# Patient Record
Sex: MALE | Race: WHITE | NOT HISPANIC OR LATINO | Employment: OTHER | URBAN - METROPOLITAN AREA
[De-identification: names, ages, dates, MRNs, and addresses within clinical notes are randomized per-mention and may not be internally consistent; named-entity substitution may affect disease eponyms.]

---

## 2018-06-21 ENCOUNTER — THERAPY VISIT (OUTPATIENT)
Dept: PHYSICAL THERAPY | Facility: CLINIC | Age: 73
End: 2018-06-21
Payer: COMMERCIAL

## 2018-06-21 DIAGNOSIS — M25.572 ANKLE PAIN, LEFT: Primary | ICD-10-CM

## 2018-06-21 PROCEDURE — G8979 MOBILITY GOAL STATUS: HCPCS | Mod: GP | Performed by: PHYSICAL THERAPIST

## 2018-06-21 PROCEDURE — 97161 PT EVAL LOW COMPLEX 20 MIN: CPT | Mod: GP | Performed by: PHYSICAL THERAPIST

## 2018-06-21 PROCEDURE — 97140 MANUAL THERAPY 1/> REGIONS: CPT | Mod: GP | Performed by: PHYSICAL THERAPIST

## 2018-06-21 PROCEDURE — G8978 MOBILITY CURRENT STATUS: HCPCS | Mod: GP | Performed by: PHYSICAL THERAPIST

## 2018-06-21 NOTE — PROGRESS NOTES
[facility] Initial Evaluation  Subjective:  Patient is a 72 year old male presenting with rehab left ankle/foot hpi.   Sanket Mathew is a 72 year old male with a left ankle condition.  Condition occurred with:  Repetition/overuse.  Condition occurred: at home.  This is a chronic condition  L ankle pain recent onset began on 12/21/18 with playing [employer] down in [place]. Pt was playing 4-5 hours per day. Has very active history of tennis, pickleball, racket ball, and golf. No acute injury to ankle but chronic pain. Has had mild temporary relief with 2 cortisone shots over the last two years..    Patient reports pain:  Anterior and medial.  Radiates to: none.  Pain is described as sharp and is intermittent and reported as 4/10.  Associated symptoms:  Loss of motion/stiffness and loss of strength. Pain is worse during the day.  Symptoms are exacerbated by activity, walking and running and relieved by rest and bracing/immobilizing.  Since onset symptoms are unchanged.  Special tests:  MRI and x-ray.  Previous treatment: none.  Improvement with previous treatment: none.  General health as reported by patient is good.  Pertinent medical history includes:  None.  Medical allergies: no.  Other surgeries include:  Orthopedic surgery (multiple hand surgeries, lumbar fusion).  Current medications:  None as reported by patient.  Current occupation is Semi retired  .  Patient is working in normal job without restrictions.  Employment tasks: n/a.    Barriers include:  None as reported by patient.    Red flags:  None as reported by patient.                        Objective:  System    Ankle/Foot Evaluation  ROM:    AROM:    Dorsiflexion:  Left:   0  Right:   5  Plantarflexion:  Left:  WNL    Right:  WNL  Inversion:  Left:  WNLWNL     Right:  WNL  Eversion:  WNL     Right:  WNL      PROM:    Dorsiflexion:  Left:    0     Right:   10   Plantarflexion:  Left:    WNL     Right:  WNL  Inversion:  Left:       WNL     Right:   WNL  Eversion: Left:   WNL     Right:  WNL        Endfeel:  Mod stiffness in limited ankle DF L > R    LIGAMENT TESTING: normal              SPECIAL TESTS: normal    PALPATION: Palpation of ankle: denies tenderness.    EDEMA: normal          MOBILITY TESTING:       Talocrural Left: hypomobile      Subtalar Left: hypomobile      Midtarsal Left: hypomobile      First Ray Left: hypomobile                                             Hip Evaluation  Hip PROM:                      Endfeel: mod stiffness in limited global hip mobility L > R                       General     ROS    Assessment/Plan:    Patient is a 72 year old male with left side ankle complaints.    Patient has the following significant findings with corresponding treatment plan.                Diagnosis 1:  L ankle pain  Pain -  manual therapy, splint/taping/bracing/orthotics, self management, education and home program  Decreased ROM/flexibility - manual therapy and therapeutic exercise  Decreased joint mobility - manual therapy and therapeutic exercise  Decreased strength - therapeutic exercise and therapeutic activities  Impaired balance - neuro re-education and therapeutic activities  Decreased proprioception - neuro re-education and therapeutic activities  Impaired gait - gait training    Therapy Evaluation Codes:   1) History comprised of:   Personal factors that impact the plan of care:      Overall behavior pattern, Past/current experiences and Time since onset of symptoms.    Comorbidity factors that impact the plan of care are:      None.     Medications impacting care: None.  2) Examination of Body Systems comprised of:   Body structures and functions that impact the plan of care:      Ankle.   Activity limitations that impact the plan of care are:      Jumping, Running, Sports, Squatting/kneeling, Stairs, Standing, Walking and Working.  3) Clinical presentation characteristics  are:   Stable/Uncomplicated.  4) Decision-Making    Low complexity using standardized patient assessment instrument and/or measureable assessment of functional outcome.  Cumulative Therapy Evaluation is: Low complexity.    Previous and current functional limitations:  (See Goal Flow Sheet for this information)    Short term and Long term goals: (See Goal Flow Sheet for this information)     Communication ability:  Patient appears to be able to clearly communicate and understand verbal and written communication and follow directions correctly.  Treatment Explanation - The following has been discussed with the patient:   RX ordered/plan of care  Anticipated outcomes  Possible risks and side effects  This patient would benefit from PT intervention to resume normal activities.   Rehab potential is good.    Frequency:  1 X week, once daily  Duration:  for 12 weeks  Discharge Plan:  Achieve all LTG.  Independent in home treatment program.  Reach maximal therapeutic benefit.    Please refer to the daily flowsheet for treatment today, total treatment time and time spent performing 1:1 timed codes.

## 2018-06-21 NOTE — MR AVS SNAPSHOT
After Visit Summary   6/21/2018    Sanket Mathew    MRN: [id number]           Patient Information     Date Of Birth          1945        Visit Information        Provider Department      6/21/2018 10:35 AM Tomi Martinez PT [facility]        Today's Diagnoses     Ankle pain, left    -  1       Follow-ups after your visit        Your next 10 appointments already scheduled     Jun 28, 2018  7:55 AM CDT   SENA Extremity with Tomi Martinez PT   [facility] ([facility])    [address] [facility] [address] [phone]              Who to contact     If you have questions or need follow up information about today's clinic visit or your schedule please contact [facility] directly at [phone].  Normal or non-critical lab and imaging results will be communicated to you by MyChart, letter or phone within 4 business days after the clinic has received the results. If you do not hear from us within 7 days, please contact the clinic through MyChart or phone. If you have a critical or abnormal lab result, we will notify you by phone as soon as possible.  Submit refill requests through [employer] or call your pharmacy and they will forward the refill request to us. Please allow 3 business days for your refill to be completed.          Additional Information About Your Visit        Care EveryWhere ID     This is your Care EveryWhere ID. This could be used by other organizations to access your [facility] medical records  [id number]         Blood Pressure from Last 3 Encounters:   No data found for BP    Weight from Last 3 Encounters:   No data found for Wt              We Performed the Following     HC PT EVAL, LOW COMPLEXITY     SENA INITIAL EVAL REPORT     MANUAL THER TECH,1+REGIONS,EA 15 MIN        Primary Care Provider Office Phone # Fax #    Jordyn [facility] [phone]       [address] [place] [address] [place].  [facility]  [id number]        Equal Access to Services     [facility] EDGARD : Hadii noemy Angel, quintin mccarty, valentine johnson. So [facility] [phone].    ATENCIÓN: Si habla español, tiene a duque disposición servicios gratuitos de asistencia lingüística. Llame al [phone].    We comply with applicable federal civil rights laws and [place] laws. We do not discriminate on the basis of race, color, national origin, age, disability, sex, sexual orientation, or gender identity.            Thank you!     Thank you for choosing [facility]  for your care. Our goal is always to provide you with excellent care. Hearing back from our patients is one way we can continue to improve our services. Please take a few minutes to complete the written survey that you may receive in the mail after your visit with us. Thank you!             Your Updated Medication List - Protect others around you: Learn how to safely use, store and throw away your medicines at www.disposemymeds.org.      Notice  As of 6/21/2018 11:53 AM    You have not been prescribed any medications.

## 2018-06-28 ENCOUNTER — THERAPY VISIT (OUTPATIENT)
Dept: PHYSICAL THERAPY | Facility: CLINIC | Age: 73
End: 2018-06-28
Payer: COMMERCIAL

## 2018-06-28 DIAGNOSIS — M25.572 ANKLE PAIN, LEFT: ICD-10-CM

## 2018-06-28 PROCEDURE — 97110 THERAPEUTIC EXERCISES: CPT | Mod: GP | Performed by: PHYSICAL THERAPIST

## 2018-06-28 PROCEDURE — 97112 NEUROMUSCULAR REEDUCATION: CPT | Mod: GP | Performed by: PHYSICAL THERAPIST

## 2018-07-05 ENCOUNTER — THERAPY VISIT (OUTPATIENT)
Dept: PHYSICAL THERAPY | Facility: CLINIC | Age: 73
End: 2018-07-05
Payer: COMMERCIAL

## 2018-07-05 DIAGNOSIS — M25.572 ANKLE PAIN, LEFT: ICD-10-CM

## 2018-07-05 PROCEDURE — 97112 NEUROMUSCULAR REEDUCATION: CPT | Mod: GP | Performed by: PHYSICAL THERAPIST

## 2018-07-05 PROCEDURE — 97110 THERAPEUTIC EXERCISES: CPT | Mod: GP | Performed by: PHYSICAL THERAPIST

## 2018-07-05 PROCEDURE — 97140 MANUAL THERAPY 1/> REGIONS: CPT | Mod: GP | Performed by: PHYSICAL THERAPIST

## 2018-07-12 ENCOUNTER — THERAPY VISIT (OUTPATIENT)
Dept: PHYSICAL THERAPY | Facility: CLINIC | Age: 73
End: 2018-07-12
Payer: COMMERCIAL

## 2018-07-12 DIAGNOSIS — M25.572 ANKLE PAIN, LEFT: ICD-10-CM

## 2018-07-12 PROCEDURE — 97140 MANUAL THERAPY 1/> REGIONS: CPT | Mod: GP | Performed by: PHYSICAL THERAPIST

## 2018-07-12 NOTE — MR AVS SNAPSHOT
After Visit Summary   7/12/2018    Sanket Mathew    MRN: [id number]           Patient Information     Date Of Birth          1945        Visit Information        Provider Department      7/12/2018 7:55 AM Tomi Martinez [facility]        Today's Diagnoses     Ankle pain, left           Follow-ups after your visit        Your next 10 appointments already scheduled     Jul 19, 2018  7:55 AM CDT   SENA Extremity with Tomi Martinez [facility] ([facility])    [address]  [facility] [id number] [phone]            Jul 26, 2018  9:55 AM CDT   SENA Extremity with Tomi Martinez PT   [facility] ([facility])    [address]  [facility] [id number] [phone]              Who to contact     If you have questions or need follow up information about today's clinic visit or your schedule please contact [facility] directly at [phone].  Normal or non-critical lab and imaging results will be communicated to you by MyChart, letter or phone within 4 business days after the clinic has received the results. If you do not hear from us within 7 days, please contact the clinic through MyChart or phone. If you have a critical or abnormal lab result, we will notify you by phone as soon as possible.  Submit refill requests through [employer] or call your pharmacy and they will forward the refill request to us. Please allow 3 business days for your refill to be completed.          Additional Information About Your Visit        Care EveryWhere ID     This is your Care EveryWhere ID. This could be used by other organizations to access your [facility] medical records  [id number]         Blood Pressure from Last 3 Encounters:   No data found for BP    Weight from Last 3 Encounters:   No data found for Wt              We Performed the Following     MANUAL THER TECH,1+REGIONS,EA 15 MIN        Primary Care  Provider Office Phone # Fax #    Jordyn [facility] [phone]       [address]  [facility] [address]        Equal Access to Services     JIMBO RENE : Hadii aad ku hadethanmonika Sojustinali, wamanoloda luqadaha, qajadata kaalmada cande, valentine mosesmindy boltonnomi gold edin rios. So [facility] [phone].    ATENCIÓN: Si habla español, tiene a duque disposición servicios gratuitos de asistencia lingüística. Llame al [phone].    We comply with applicable federal civil rights laws and [place] laws. We do not discriminate on the basis of race, color, national origin, age, disability, sex, sexual orientation, or gender identity.            Thank you!     Thank you for choosing [facility]  for your care. Our goal is always to provide you with excellent care. Hearing back from our patients is one way we can continue to improve our services. Please take a few minutes to complete the written survey that you may receive in the mail after your visit with us. Thank you!             Your Updated Medication List - Protect others around you: Learn how to safely use, store and throw away your medicines at www.disposemymeds.org.      Notice  As of 7/12/2018  8:35 AM    You have not been prescribed any medications.

## 2019-06-06 PROBLEM — M25.572 ANKLE PAIN, LEFT: Status: RESOLVED | Noted: 2018-06-21 | Resolved: 2019-06-06

## 2019-06-06 NOTE — PROGRESS NOTES
DISCHARGE SUMMARY    Sanket Mathew was seen 4 times for evaluation and treatment.  Patient did not return for further treatment and current status is unknown.  Due to short treatment duration, no objective or functional changes were made.  Please see goal flow sheet from episode noted date below and initial evaluation for further information.  Patient is discharged from therapy and therapy episode is resolved as of 06/06/19.      Linked Episodes   Type: Episode: Status: Noted: Resolved: Last update: Updated by:   PHYSICAL THERAPY L ankle 6/21/18 Active 6/21/2018 7/12/2018  7:57 AM Tomi Martinez, PT      Comments:

## 2021-05-31 ENCOUNTER — RECORDS - HEALTHEAST (OUTPATIENT)
Dept: ADMINISTRATIVE | Facility: CLINIC | Age: 76
End: 2021-05-31